# Patient Record
Sex: FEMALE | Race: WHITE | Employment: STUDENT | URBAN - METROPOLITAN AREA
[De-identification: names, ages, dates, MRNs, and addresses within clinical notes are randomized per-mention and may not be internally consistent; named-entity substitution may affect disease eponyms.]

---

## 2017-02-07 PROBLEM — G43.009 MIGRAINE WITHOUT AURA AND WITHOUT STATUS MIGRAINOSUS, NOT INTRACTABLE: Status: ACTIVE | Noted: 2017-02-07

## 2018-11-26 PROBLEM — Z91.89 DES EXPOSURE IN UTERO: Status: ACTIVE | Noted: 2018-11-26

## 2019-10-09 ENCOUNTER — OFFICE VISIT (OUTPATIENT)
Dept: ORTHOPEDIC SURGERY | Age: 59
End: 2019-10-09
Payer: COMMERCIAL

## 2019-10-09 VITALS
SYSTOLIC BLOOD PRESSURE: 137 MMHG | WEIGHT: 150 LBS | DIASTOLIC BLOOD PRESSURE: 87 MMHG | HEIGHT: 67 IN | BODY MASS INDEX: 23.54 KG/M2 | HEART RATE: 77 BPM | TEMPERATURE: 98.4 F

## 2019-10-09 DIAGNOSIS — M25.522 ELBOW PAIN, LEFT: Primary | ICD-10-CM

## 2019-10-09 PROCEDURE — 99203 OFFICE O/P NEW LOW 30 MIN: CPT | Performed by: ORTHOPAEDIC SURGERY

## 2019-11-15 ENCOUNTER — HOSPITAL ENCOUNTER (OUTPATIENT)
Age: 59
Discharge: HOME | End: 2019-11-17
Payer: COMMERCIAL

## 2019-11-15 DIAGNOSIS — R10.31 RIGHT GROIN PAIN: ICD-10-CM

## 2019-11-15 LAB
ALBUMIN SERPL-MCNC: 4.2 G/DL (ref 3.5–5.2)
ALP BLD-CCNC: 86 U/L (ref 35–104)
ALT SERPL-CCNC: 24 U/L (ref 0–32)
ANION GAP SERPL CALCULATED.3IONS-SCNC: 15 MMOL/L (ref 7–16)
AST SERPL-CCNC: 25 U/L (ref 0–31)
BASOPHILS ABSOLUTE: 0.06 E9/L (ref 0–0.2)
BASOPHILS RELATIVE PERCENT: 0.7 % (ref 0–2)
BILIRUB SERPL-MCNC: 0.4 MG/DL (ref 0–1.2)
BUN BLDV-MCNC: 7 MG/DL (ref 8–23)
CALCIUM SERPL-MCNC: 10.2 MG/DL (ref 8.6–10.2)
CHLORIDE BLD-SCNC: 98 MMOL/L (ref 98–107)
CO2: 25 MMOL/L (ref 22–29)
CREAT SERPL-MCNC: 0.6 MG/DL (ref 0.5–1)
EOSINOPHILS ABSOLUTE: 0.09 E9/L (ref 0.05–0.5)
EOSINOPHILS RELATIVE PERCENT: 1.1 % (ref 0–6)
GFR AFRICAN AMERICAN: >60
GFR NON-AFRICAN AMERICAN: >60 ML/MIN/1.73
GLUCOSE BLD-MCNC: 82 MG/DL (ref 74–99)
HCT VFR BLD CALC: 41.1 % (ref 34–48)
HEMOGLOBIN: 13.3 G/DL (ref 11.5–15.5)
IMMATURE GRANULOCYTES #: 0.02 E9/L
IMMATURE GRANULOCYTES %: 0.2 % (ref 0–5)
LYMPHOCYTES ABSOLUTE: 1.33 E9/L (ref 1.5–4)
LYMPHOCYTES RELATIVE PERCENT: 15.6 % (ref 20–42)
MCH RBC QN AUTO: 30.1 PG (ref 26–35)
MCHC RBC AUTO-ENTMCNC: 32.4 % (ref 32–34.5)
MCV RBC AUTO: 93 FL (ref 80–99.9)
MONOCYTES ABSOLUTE: 0.57 E9/L (ref 0.1–0.95)
MONOCYTES RELATIVE PERCENT: 6.7 % (ref 2–12)
NEUTROPHILS ABSOLUTE: 6.46 E9/L (ref 1.8–7.3)
NEUTROPHILS RELATIVE PERCENT: 75.7 % (ref 43–80)
PDW BLD-RTO: 12.2 FL (ref 11.5–15)
PLATELET # BLD: 303 E9/L (ref 130–450)
PMV BLD AUTO: 11.1 FL (ref 7–12)
POTASSIUM SERPL-SCNC: 4.3 MMOL/L (ref 3.5–5)
RBC # BLD: 4.42 E12/L (ref 3.5–5.5)
SODIUM BLD-SCNC: 138 MMOL/L (ref 132–146)
TOTAL PROTEIN: 7.4 G/DL (ref 6.4–8.3)
WBC # BLD: 8.5 E9/L (ref 4.5–11.5)

## 2019-11-15 PROCEDURE — 80053 COMPREHEN METABOLIC PANEL: CPT

## 2019-11-15 PROCEDURE — 85025 COMPLETE CBC W/AUTO DIFF WBC: CPT

## 2019-12-16 ENCOUNTER — HOSPITAL ENCOUNTER (OUTPATIENT)
Age: 59
Discharge: HOME | End: 2019-12-18

## 2019-12-16 PROCEDURE — 88305 TISSUE EXAM BY PATHOLOGIST: CPT

## 2019-12-16 PROCEDURE — 88342 IMHCHEM/IMCYTCHM 1ST ANTB: CPT

## 2020-06-09 ENCOUNTER — HOSPITAL ENCOUNTER (OUTPATIENT)
Age: 60
Discharge: HOME | End: 2020-06-11
Payer: COMMERCIAL

## 2020-06-09 PROCEDURE — 87088 URINE BACTERIA CULTURE: CPT

## 2020-06-11 LAB — URINE CULTURE, ROUTINE: NORMAL

## 2020-09-21 ENCOUNTER — HOSPITAL ENCOUNTER (OUTPATIENT)
Age: 60
Discharge: HOME | End: 2020-09-23
Payer: COMMERCIAL

## 2020-09-21 PROCEDURE — 88112 CYTOPATH CELL ENHANCE TECH: CPT

## 2020-09-21 PROCEDURE — 87088 URINE BACTERIA CULTURE: CPT

## 2020-09-24 LAB — URINE CULTURE, ROUTINE: NORMAL

## 2020-09-29 ENCOUNTER — PROCEDURE VISIT (OUTPATIENT)
Dept: AUDIOLOGY | Age: 60
End: 2020-09-29
Payer: COMMERCIAL

## 2020-09-29 ENCOUNTER — OFFICE VISIT (OUTPATIENT)
Dept: ENT CLINIC | Age: 60
End: 2020-09-29
Payer: COMMERCIAL

## 2020-09-29 VITALS
HEIGHT: 67 IN | SYSTOLIC BLOOD PRESSURE: 137 MMHG | HEART RATE: 69 BPM | WEIGHT: 147 LBS | DIASTOLIC BLOOD PRESSURE: 87 MMHG | BODY MASS INDEX: 23.07 KG/M2

## 2020-09-29 PROCEDURE — 92567 TYMPANOMETRY: CPT | Performed by: AUDIOLOGIST

## 2020-09-29 PROCEDURE — 99204 OFFICE O/P NEW MOD 45 MIN: CPT | Performed by: OTOLARYNGOLOGY

## 2020-09-29 RX ORDER — CALCIUM CARBONATE 500(1250)
1200 TABLET ORAL DAILY
COMMUNITY

## 2020-09-29 RX ORDER — ACETAMINOPHEN 160 MG
2000 TABLET,DISINTEGRATING ORAL PRN
COMMUNITY

## 2020-09-29 RX ORDER — MAGNESIUM OXIDE 400 MG/1
400 TABLET ORAL DAILY
COMMUNITY

## 2020-09-29 RX ORDER — MELOXICAM 15 MG/1
15 TABLET ORAL DAILY
Qty: 30 TABLET | Refills: 3 | Status: SHIPPED
Start: 2020-09-29 | End: 2021-04-26

## 2020-09-29 NOTE — PROGRESS NOTES
This patient was referred for audiometric/tympanometric testing by Dr. Josi Harrison due to bilateral ear pain. Patient also reported some jaw and neck pain, on the left. Tympanometry revealed normal middle ear peak pressure and compliance, in the right ear and negative middle ear pressure (-100 daPa), in the left ear. The results were reviewed with the patient. Recommendations for follow up will be made pending physician consult.       Raman Daly CCC-A  [address] [facility] [id number]  Electronically signed by Raman Daly on 9/29/2020 at 9:41 AM
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
## 2021-02-04 ENCOUNTER — HOSPITAL ENCOUNTER (OUTPATIENT)
Dept: GENERAL RADIOLOGY | Age: 61
Discharge: HOME | End: 2021-02-06
Payer: COMMERCIAL

## 2021-02-04 DIAGNOSIS — Z12.31 ENCOUNTER FOR SCREENING MAMMOGRAM FOR MALIGNANT NEOPLASM OF BREAST: ICD-10-CM

## 2021-02-04 PROCEDURE — 77063 BREAST TOMOSYNTHESIS BI: CPT

## 2021-08-03 DIAGNOSIS — R06.02 SHORTNESS OF BREATH ON EXERTION: ICD-10-CM

## 2021-08-03 DIAGNOSIS — K74.69 OTHER CIRRHOSIS OF LIVER (HCC): ICD-10-CM

## 2021-08-03 DIAGNOSIS — E04.1 THYROID NODULE, COLD: ICD-10-CM

## 2021-08-03 DIAGNOSIS — Z11.59 ENCOUNTER FOR HEPATITIS C SCREENING TEST FOR LOW RISK PATIENT: ICD-10-CM

## 2021-08-03 LAB
ALBUMIN SERPL-MCNC: 4.8 G/DL (ref 3.5–5.2)
ALP BLD-CCNC: 92 U/L (ref 35–104)
ALT SERPL-CCNC: 22 U/L (ref 0–32)
ANION GAP SERPL CALCULATED.3IONS-SCNC: 16 MMOL/L (ref 7–16)
AST SERPL-CCNC: 32 U/L (ref 0–31)
BASOPHILS ABSOLUTE: 0.05 E9/L (ref 0–0.2)
BASOPHILS RELATIVE PERCENT: 0.9 % (ref 0–2)
BILIRUB SERPL-MCNC: 0.4 MG/DL (ref 0–1.2)
BUN BLDV-MCNC: 12 MG/DL (ref 6–23)
CALCIUM SERPL-MCNC: 9.9 MG/DL (ref 8.6–10.2)
CHLORIDE BLD-SCNC: 99 MMOL/L (ref 98–107)
CO2: 22 MMOL/L (ref 22–29)
CREAT SERPL-MCNC: 0.7 MG/DL (ref 0.5–1)
EOSINOPHILS ABSOLUTE: 0.07 E9/L (ref 0.05–0.5)
EOSINOPHILS RELATIVE PERCENT: 1.3 % (ref 0–6)
GFR AFRICAN AMERICAN: >60
GFR NON-AFRICAN AMERICAN: >60 ML/MIN/1.73
GLUCOSE BLD-MCNC: 90 MG/DL (ref 74–99)
HCT VFR BLD CALC: 41.7 % (ref 34–48)
HEMOGLOBIN: 13.7 G/DL (ref 11.5–15.5)
IMMATURE GRANULOCYTES #: 0.01 E9/L
IMMATURE GRANULOCYTES %: 0.2 % (ref 0–5)
LYMPHOCYTES ABSOLUTE: 1.45 E9/L (ref 1.5–4)
LYMPHOCYTES RELATIVE PERCENT: 27.4 % (ref 20–42)
MCH RBC QN AUTO: 30 PG (ref 26–35)
MCHC RBC AUTO-ENTMCNC: 32.9 % (ref 32–34.5)
MCV RBC AUTO: 91.4 FL (ref 80–99.9)
MONOCYTES ABSOLUTE: 0.31 E9/L (ref 0.1–0.95)
MONOCYTES RELATIVE PERCENT: 5.9 % (ref 2–12)
NEUTROPHILS ABSOLUTE: 3.4 E9/L (ref 1.8–7.3)
NEUTROPHILS RELATIVE PERCENT: 64.3 % (ref 43–80)
PDW BLD-RTO: 12.1 FL (ref 11.5–15)
PLATELET # BLD: 311 E9/L (ref 130–450)
PMV BLD AUTO: 10.5 FL (ref 7–12)
POTASSIUM SERPL-SCNC: 4.7 MMOL/L (ref 3.5–5)
RBC # BLD: 4.56 E12/L (ref 3.5–5.5)
SODIUM BLD-SCNC: 137 MMOL/L (ref 132–146)
T3 UPTAKE PERCENT: 33.2 % (ref 22.5–37)
T4 FREE: 1.47 NG/DL (ref 0.93–1.7)
TOTAL PROTEIN: 7.2 G/DL (ref 6.4–8.3)
TSH SERPL DL<=0.05 MIU/L-ACNC: 0.9 UIU/ML (ref 0.27–4.2)
WBC # BLD: 5.3 E9/L (ref 4.5–11.5)

## 2021-08-04 LAB — HEPATITIS C ANTIBODY INTERPRETATION: NORMAL

## 2021-09-14 LAB
CHOLESTEROL, TOTAL: 186 MG/DL (ref 0–199)
HDLC SERPL-MCNC: 83 MG/DL
LDL CHOLESTEROL CALCULATED: 87 MG/DL (ref 0–99)
TRIGL SERPL-MCNC: 79 MG/DL (ref 0–149)
VLDLC SERPL CALC-MCNC: 16 MG/DL

## 2022-02-01 DIAGNOSIS — E04.1 THYROID NODULE, COLD: ICD-10-CM

## 2022-02-01 LAB
T4 FREE: 1.46 NG/DL (ref 0.93–1.7)
TSH SERPL DL<=0.05 MIU/L-ACNC: 0.98 UIU/ML (ref 0.27–4.2)

## 2022-04-07 LAB
MEASLES IMMUNE (IGG): NORMAL
MUMPS AB IGG: NORMAL
RUBELLA ANTIBODY IGG: NORMAL
VARICELLA-ZOSTER VIRUS AB, IGG: NORMAL

## 2023-02-01 DIAGNOSIS — N30.01 ACUTE CYSTITIS WITH HEMATURIA: ICD-10-CM

## 2024-12-06 ENCOUNTER — TRANSCRIBE ORDERS (OUTPATIENT)
Dept: ADMINISTRATIVE | Age: 64
End: 2024-12-06

## 2024-12-06 DIAGNOSIS — R05.9 COUGH, UNSPECIFIED TYPE: Primary | ICD-10-CM

## 2024-12-20 ENCOUNTER — HOSPITAL ENCOUNTER (OUTPATIENT)
Dept: MRI IMAGING | Age: 64
Discharge: HOME | End: 2024-12-22
Attending: INTERNAL MEDICINE
Payer: MEDICARE

## 2024-12-20 DIAGNOSIS — R94.131 ABNORMAL EMG: ICD-10-CM

## 2024-12-20 PROCEDURE — A9579 GAD-BASE MR CONTRAST NOS,1ML: HCPCS | Performed by: RADIOLOGY

## 2024-12-20 PROCEDURE — 72158 MRI LUMBAR SPINE W/O & W/DYE: CPT

## 2024-12-20 PROCEDURE — 6360000004 HC RX CONTRAST MEDICATION: Performed by: RADIOLOGY

## 2024-12-20 PROCEDURE — 2500000003 HC RX 250 WO HCPCS: Performed by: RADIOLOGY

## 2024-12-20 RX ORDER — SODIUM CHLORIDE 0.9 % (FLUSH) 0.9 %
5-40 SYRINGE (ML) INJECTION 2 TIMES DAILY
Status: DISCONTINUED | OUTPATIENT
Start: 2024-12-20 | End: 2024-12-23 | Stop reason: HOSPADM

## 2024-12-20 RX ADMIN — Medication 10 ML: at 10:14

## 2024-12-20 RX ADMIN — GADOTERIDOL 14 ML: 279.3 INJECTION, SOLUTION INTRAVENOUS at 10:14

## 2024-12-23 ENCOUNTER — HOSPITAL ENCOUNTER (OUTPATIENT)
Dept: PULMONOLOGY | Age: 64
Discharge: HOME | End: 2024-12-23
Attending: INTERNAL MEDICINE
Payer: MEDICARE

## 2024-12-23 DIAGNOSIS — R05.9 COUGH, UNSPECIFIED TYPE: ICD-10-CM

## 2024-12-23 PROCEDURE — 94060 EVALUATION OF WHEEZING: CPT

## 2025-01-15 ENCOUNTER — OFFICE VISIT (OUTPATIENT)
Dept: ORTHOPEDIC SURGERY | Age: 65
End: 2025-01-15
Payer: MEDICARE

## 2025-01-15 VITALS
DIASTOLIC BLOOD PRESSURE: 76 MMHG | OXYGEN SATURATION: 99 % | TEMPERATURE: 97.6 F | SYSTOLIC BLOOD PRESSURE: 138 MMHG | HEART RATE: 75 BPM | RESPIRATION RATE: 20 BRPM

## 2025-01-15 DIAGNOSIS — G56.22 NEURITIS OF LEFT ULNAR NERVE: ICD-10-CM

## 2025-01-15 DIAGNOSIS — M25.522 LEFT ELBOW PAIN: Primary | ICD-10-CM

## 2025-01-15 PROCEDURE — 99204 OFFICE O/P NEW MOD 45 MIN: CPT | Performed by: STUDENT IN AN ORGANIZED HEALTH CARE EDUCATION/TRAINING PROGRAM

## 2025-01-15 PROCEDURE — 1160F RVW MEDS BY RX/DR IN RCRD: CPT | Performed by: STUDENT IN AN ORGANIZED HEALTH CARE EDUCATION/TRAINING PROGRAM

## 2025-01-15 PROCEDURE — 1159F MED LIST DOCD IN RCRD: CPT | Performed by: STUDENT IN AN ORGANIZED HEALTH CARE EDUCATION/TRAINING PROGRAM

## 2025-01-15 PROCEDURE — 1125F AMNT PAIN NOTED PAIN PRSNT: CPT | Performed by: STUDENT IN AN ORGANIZED HEALTH CARE EDUCATION/TRAINING PROGRAM

## 2025-01-15 PROCEDURE — 1123F ACP DISCUSS/DSCN MKR DOCD: CPT | Performed by: STUDENT IN AN ORGANIZED HEALTH CARE EDUCATION/TRAINING PROGRAM

## 2025-01-15 NOTE — PROGRESS NOTES
This report was completed using batterii voiced recognition software.  Every effort has been made to ensure accuracy; however, inadvertent computerized transcription errors may be present.

## 2025-04-04 ENCOUNTER — OFFICE VISIT (OUTPATIENT)
Dept: PHYSICAL MEDICINE AND REHAB | Age: 65
End: 2025-04-04
Payer: MEDICARE

## 2025-04-04 VITALS
BODY MASS INDEX: 23.32 KG/M2 | HEART RATE: 65 BPM | WEIGHT: 148.59 LBS | HEIGHT: 67 IN | SYSTOLIC BLOOD PRESSURE: 150 MMHG | DIASTOLIC BLOOD PRESSURE: 87 MMHG

## 2025-04-04 DIAGNOSIS — M54.12 CERVICAL RADICULITIS: ICD-10-CM

## 2025-04-04 DIAGNOSIS — M79.602 LEFT ARM PAIN: ICD-10-CM

## 2025-04-04 DIAGNOSIS — M47.812 CERVICAL SPONDYLOSIS: ICD-10-CM

## 2025-04-04 DIAGNOSIS — R20.2 LEFT HAND PARESTHESIA: Primary | ICD-10-CM

## 2025-04-04 DIAGNOSIS — R29.898 LEFT HAND WEAKNESS: ICD-10-CM

## 2025-04-04 PROCEDURE — 99204 OFFICE O/P NEW MOD 45 MIN: CPT | Performed by: STUDENT IN AN ORGANIZED HEALTH CARE EDUCATION/TRAINING PROGRAM

## 2025-04-04 PROCEDURE — 1123F ACP DISCUSS/DSCN MKR DOCD: CPT | Performed by: STUDENT IN AN ORGANIZED HEALTH CARE EDUCATION/TRAINING PROGRAM

## 2025-04-04 NOTE — PROGRESS NOTES
proceed with the recommended treatment as described above.       Thank you for the consultation and for allowing me to participate in the care of this patient.        Sincerely,     Margarita Greenberg MD  Board Certified Physical Medicine and Rehabilitation      Patient consented to the use of EMRE software for the generation of office visit note.

## 2025-05-02 ENCOUNTER — HOSPITAL ENCOUNTER (OUTPATIENT)
Dept: MRI IMAGING | Age: 65
Discharge: HOME | End: 2025-05-02
Attending: STUDENT IN AN ORGANIZED HEALTH CARE EDUCATION/TRAINING PROGRAM
Payer: MEDICARE

## 2025-05-02 DIAGNOSIS — M47.812 CERVICAL SPONDYLOSIS: ICD-10-CM

## 2025-05-02 DIAGNOSIS — R29.898 LEFT HAND WEAKNESS: ICD-10-CM

## 2025-05-02 DIAGNOSIS — M54.12 CERVICAL RADICULITIS: ICD-10-CM

## 2025-05-02 DIAGNOSIS — R20.2 LEFT HAND PARESTHESIA: ICD-10-CM

## 2025-05-02 DIAGNOSIS — M79.602 LEFT ARM PAIN: ICD-10-CM

## 2025-05-02 PROCEDURE — 72141 MRI NECK SPINE W/O DYE: CPT

## 2025-05-22 ENCOUNTER — OFFICE VISIT (OUTPATIENT)
Dept: PHYSICAL MEDICINE AND REHAB | Age: 65
End: 2025-05-22
Payer: MEDICARE

## 2025-05-22 VITALS
BODY MASS INDEX: 23.07 KG/M2 | SYSTOLIC BLOOD PRESSURE: 150 MMHG | HEIGHT: 67 IN | WEIGHT: 147 LBS | HEART RATE: 73 BPM | DIASTOLIC BLOOD PRESSURE: 91 MMHG

## 2025-05-22 DIAGNOSIS — R32 URINARY INCONTINENCE, UNSPECIFIED TYPE: ICD-10-CM

## 2025-05-22 DIAGNOSIS — M48.02 CERVICAL STENOSIS OF SPINAL CANAL: ICD-10-CM

## 2025-05-22 DIAGNOSIS — M54.12 CERVICAL RADICULOPATHY: Primary | ICD-10-CM

## 2025-05-22 DIAGNOSIS — R10.2 PELVIC PAIN: ICD-10-CM

## 2025-05-22 DIAGNOSIS — M54.2 CERVICALGIA: ICD-10-CM

## 2025-05-22 PROCEDURE — 99214 OFFICE O/P EST MOD 30 MIN: CPT | Performed by: STUDENT IN AN ORGANIZED HEALTH CARE EDUCATION/TRAINING PROGRAM

## 2025-05-22 PROCEDURE — 1123F ACP DISCUSS/DSCN MKR DOCD: CPT | Performed by: STUDENT IN AN ORGANIZED HEALTH CARE EDUCATION/TRAINING PROGRAM

## 2025-05-22 PROCEDURE — 1159F MED LIST DOCD IN RCRD: CPT | Performed by: STUDENT IN AN ORGANIZED HEALTH CARE EDUCATION/TRAINING PROGRAM

## 2025-05-22 PROCEDURE — 1160F RVW MEDS BY RX/DR IN RCRD: CPT | Performed by: STUDENT IN AN ORGANIZED HEALTH CARE EDUCATION/TRAINING PROGRAM

## 2025-05-22 NOTE — PROGRESS NOTES
Margarita Greenberg MD  Physical Medicine & Rehabilitation  [address] [address]  [phone]     5/22/25    Chief Complaint   Patient presents with    Follow-up     Patient presents for MRI results.        HPI:  Lenora Banuelos is a 65 y.o. year old woman seen today in follow up regarding left elbow pain and arm pain.     History of Present Illness  The patient presents for evaluation of elbow pain, neck pain.    She reports experiencing intermittent elbow pain, which is exacerbated when bending her arm while wearing a coat. The pain is not constant but can reach a level of intensity that she finds intolerable. She also experiences occasional radiating pain and a lack of strength in her arm, which she attributes to her neck condition. She reports that she has been advised against the use of ibuprofen. She has previously tried gabapentin, but discontinued it due to adverse effects. She has not yet tried Lyrica. An EMG was conducted, yielding normal results.    She describes a sensation of tightness at the base of her neck, which she attempts to alleviate through relaxation and head movement. She has not recently engaged in formal therapy for her neck. She has previously received Botox injections for muscle relaxation and other unspecified injections along her back, which provided immediate relief. She has also received chiropractic care, during which x-rays were taken. She has previously received an injection for her back.    She is seeking a referral for pelvic floor therapy due to urinary leakage and pelvic pain. She has a history of bladder surgery and hysterectomy.    Supplemental Information  She has been diagnosed with nonalcoholic cirrhosis of the liver. She has been on PPIs for 25 years due to nausea.    Past Medical History:   Diagnosis Date    Abdominal pain     Abnormal findings on diagnostic imaging of gall bladder 9/26/2012    Anxiety 9/26/2012    Bowel obstruction (HCC)     x2

## 2025-06-27 ENCOUNTER — TELEPHONE (OUTPATIENT)
Dept: PHYSICAL MEDICINE AND REHAB | Age: 65
End: 2025-06-27

## 2025-06-27 DIAGNOSIS — R32 URINARY INCONTINENCE, UNSPECIFIED TYPE: Primary | ICD-10-CM

## 2025-06-27 DIAGNOSIS — R10.2 PELVIC PAIN: ICD-10-CM

## 2025-06-27 NOTE — TELEPHONE ENCOUNTER
Patient called office and would like pelvic floor therapy added to PT order and sent to [facility].

## 2025-08-15 ENCOUNTER — OFFICE VISIT (OUTPATIENT)
Dept: PHYSICAL MEDICINE AND REHAB | Age: 65
End: 2025-08-15
Payer: MEDICARE

## 2025-08-15 VITALS
HEART RATE: 73 BPM | WEIGHT: 147 LBS | HEIGHT: 67 IN | SYSTOLIC BLOOD PRESSURE: 133 MMHG | BODY MASS INDEX: 23.07 KG/M2 | DIASTOLIC BLOOD PRESSURE: 81 MMHG

## 2025-08-15 DIAGNOSIS — M48.02 CERVICAL STENOSIS OF SPINAL CANAL: ICD-10-CM

## 2025-08-15 DIAGNOSIS — M79.18 MYOFASCIAL PAIN SYNDROME: ICD-10-CM

## 2025-08-15 DIAGNOSIS — M54.12 CERVICAL RADICULOPATHY: Primary | ICD-10-CM

## 2025-08-15 DIAGNOSIS — R20.2 LEFT HAND PARESTHESIA: ICD-10-CM

## 2025-08-15 PROCEDURE — 1159F MED LIST DOCD IN RCRD: CPT | Performed by: STUDENT IN AN ORGANIZED HEALTH CARE EDUCATION/TRAINING PROGRAM

## 2025-08-15 PROCEDURE — 1160F RVW MEDS BY RX/DR IN RCRD: CPT | Performed by: STUDENT IN AN ORGANIZED HEALTH CARE EDUCATION/TRAINING PROGRAM

## 2025-08-15 PROCEDURE — 1123F ACP DISCUSS/DSCN MKR DOCD: CPT | Performed by: STUDENT IN AN ORGANIZED HEALTH CARE EDUCATION/TRAINING PROGRAM

## 2025-08-15 PROCEDURE — 99214 OFFICE O/P EST MOD 30 MIN: CPT | Performed by: STUDENT IN AN ORGANIZED HEALTH CARE EDUCATION/TRAINING PROGRAM

## 2025-08-15 PROCEDURE — 1125F AMNT PAIN NOTED PAIN PRSNT: CPT | Performed by: STUDENT IN AN ORGANIZED HEALTH CARE EDUCATION/TRAINING PROGRAM
